# Patient Record
Sex: FEMALE | Race: WHITE | NOT HISPANIC OR LATINO | Employment: OTHER | URBAN - METROPOLITAN AREA
[De-identification: names, ages, dates, MRNs, and addresses within clinical notes are randomized per-mention and may not be internally consistent; named-entity substitution may affect disease eponyms.]

---

## 2018-06-11 ENCOUNTER — HOSPITAL ENCOUNTER (EMERGENCY)
Facility: HOSPITAL | Age: 58
Discharge: HOME | End: 2018-06-11
Attending: EMERGENCY MEDICINE
Payer: MEDICARE

## 2018-06-11 VITALS
RESPIRATION RATE: 18 BRPM | DIASTOLIC BLOOD PRESSURE: 97 MMHG | TEMPERATURE: 99 F | SYSTOLIC BLOOD PRESSURE: 153 MMHG | OXYGEN SATURATION: 99 % | HEART RATE: 90 BPM

## 2018-06-11 DIAGNOSIS — L03.114 CELLULITIS OF LEFT ARM: ICD-10-CM

## 2018-06-11 DIAGNOSIS — M54.12 CERVICAL RADICULOPATHY: Primary | ICD-10-CM

## 2018-06-11 DIAGNOSIS — L03.113 CELLULITIS OF RIGHT ARM: ICD-10-CM

## 2018-06-11 PROCEDURE — 99284 EMERGENCY DEPT VISIT MOD MDM: CPT

## 2018-06-11 RX ORDER — CYCLOBENZAPRINE HCL 10 MG
10 TABLET ORAL 3 TIMES DAILY PRN
Qty: 15 TABLET | Refills: 0 | Status: SHIPPED | OUTPATIENT
Start: 2018-06-11 | End: 2018-06-16

## 2018-06-11 RX ORDER — ACETAMINOPHEN AND CODEINE PHOSPHATE 300; 30 MG/1; MG/1
1-2 TABLET ORAL EVERY 6 HOURS PRN
Qty: 14 TABLET | Refills: 0 | Status: SHIPPED | OUTPATIENT
Start: 2018-06-11 | End: 2019-10-13

## 2018-06-11 RX ORDER — CLINDAMYCIN HYDROCHLORIDE 300 MG/1
300 CAPSULE ORAL 4 TIMES DAILY
Qty: 28 CAPSULE | Refills: 0 | Status: SHIPPED | OUTPATIENT
Start: 2018-06-11 | End: 2018-06-18

## 2018-06-11 NOTE — ED PROVIDER NOTES
Encounter Date: 2018       History     Chief Complaint   Patient presents with    Neck Pain     reports neck pain x1mo. Was seen at [facility] but signed AMA. Reports having MRI studies done. Denies fever.      57 year old female with complaint of neck sided pain with radiation into left arm X several weeks.  Reports worsening over the past day.  Pt had MRI of neck last Monday.  No fever or chills.  Constant aching pain.  Worse with movement.  Also reports pain red lesion to left arm and right arm X one week.           Review of patient's allergies indicates:   Allergen Reactions    Benadryl allergy decongestant      Restless legs    Compazine [prochlorperazine]      Feel like climbing the walls    Imitrex [sumatriptan] Nausea Only    Phenergan plain      Restless legs    Trazodone      Restless leg    Ultram [tramadol] Other (See Comments)     RLS     Past Medical History:   Diagnosis Date    Bipolar 1 disorder     Bipolar 1 disorder     Blind left eye     Migraine headache      Past Surgical History:   Procedure Laterality Date    CARPAL TUNNEL RELEASE       SECTION, CLASSIC      EYE SURGERY      GANGLION CYST EXCISION      HYSTERECTOMY       Family History   Problem Relation Age of Onset    Cancer Mother     Hypertension Mother     Cancer Father     Hypertension Sister      Social History   Substance Use Topics    Smoking status: Current Every Day Smoker     Packs/day: 0.50     Types: Cigarettes    Smokeless tobacco: Never Used    Alcohol use No     Review of Systems   Constitutional: Negative for fever.   HENT: Negative for sore throat.    Respiratory: Negative for shortness of breath.    Cardiovascular: Negative for chest pain.   Gastrointestinal: Negative for nausea.   Genitourinary: Negative for dysuria.   Musculoskeletal: Negative for back pain.        Neck pain    Skin: Negative for rash.   Neurological: Negative for weakness.   Hematological: Does not bruise/bleed easily.        Physical Exam     Initial Vitals [06/11/18 0948]   BP Pulse Resp Temp SpO2   (!) 153/97 90 18 98.9 °F (37.2 °C) 99 %      MAP       115.67         Physical Exam    Nursing note and vitals reviewed.  Constitutional: She appears well-developed and well-nourished.   HENT:   Head: Normocephalic.   Eyes: Conjunctivae are normal. Pupils are equal, round, and reactive to light.   Neck: Normal range of motion.   Cardiovascular: Normal rate, regular rhythm and normal heart sounds.   Pulmonary/Chest: Breath sounds normal.   Abdominal: Soft.   Musculoskeletal:   Left trapezial tenderness, pain with ROM of neck and left arm, full ROM X 4, 5/5 X 4   Neurological: She is alert.   Skin: Skin is warm. Capillary refill takes less than 2 seconds.   1 cm resolving area of erythema on left and right forearm, no pus drainage, no induration          ED Course   Procedures  Labs Reviewed - No data to display       No orders to display                             Clinical Impression:   The primary encounter diagnosis was Cervical radiculopathy. Diagnoses of Cellulitis of right arm and Cellulitis of left arm were also pertinent to this visit.                             Johnathon Francisco NP  06/11/18 1016

## 2020-08-05 PROBLEM — K56.609 SBO (SMALL BOWEL OBSTRUCTION): Status: ACTIVE | Noted: 2020-08-05

## 2020-08-24 PROBLEM — K56.609 SMALL BOWEL OBSTRUCTION: Status: ACTIVE | Noted: 2020-08-24

## 2020-08-25 ENCOUNTER — DOCUMENT SCAN (OUTPATIENT)
Dept: HOME HEALTH SERVICES | Facility: HOSPITAL | Age: 60
End: 2020-08-25

## 2020-08-26 ENCOUNTER — EXTERNAL HOME HEALTH (OUTPATIENT)
Dept: HOME HEALTH SERVICES | Facility: HOSPITAL | Age: 60
End: 2020-08-26

## 2020-08-27 ENCOUNTER — EXTERNAL HOME HEALTH (OUTPATIENT)
Dept: HOME HEALTH SERVICES | Facility: HOSPITAL | Age: 60
End: 2020-08-27

## 2020-09-03 ENCOUNTER — EXTERNAL HOME HEALTH (OUTPATIENT)
Dept: HOME HEALTH SERVICES | Facility: HOSPITAL | Age: 60
End: 2020-09-03

## 2020-09-14 ENCOUNTER — DOCUMENT SCAN (OUTPATIENT)
Dept: HOME HEALTH SERVICES | Facility: HOSPITAL | Age: 60
End: 2020-09-14

## 2020-09-19 PROBLEM — E87.1 HYPONATREMIA: Status: ACTIVE | Noted: 2020-09-19

## 2020-09-19 PROBLEM — K56.609 SBO (SMALL BOWEL OBSTRUCTION): Status: ACTIVE | Noted: 2020-09-19

## 2020-09-19 PROBLEM — E87.6 HYPOKALEMIA: Status: ACTIVE | Noted: 2020-09-19

## 2020-09-19 PROBLEM — R60.0 LOWER EXTREMITY EDEMA: Status: ACTIVE | Noted: 2020-09-19

## 2020-09-19 PROBLEM — F31.9 BIPOLAR DISORDER: Status: ACTIVE | Noted: 2020-09-19

## 2020-09-19 PROBLEM — E86.1 HYPOTENSION DUE TO HYPOVOLEMIA: Status: ACTIVE | Noted: 2020-09-19

## 2020-09-19 PROBLEM — E87.20 LACTIC ACIDOSIS: Status: ACTIVE | Noted: 2020-09-19

## 2020-09-21 ENCOUNTER — DOCUMENT SCAN (OUTPATIENT)
Dept: HOME HEALTH SERVICES | Facility: HOSPITAL | Age: 60
End: 2020-09-21

## 2020-09-23 PROBLEM — K50.00 REGIONAL ENTERITIS OF SMALL BOWEL: Status: ACTIVE | Noted: 2020-09-23

## 2020-09-30 ENCOUNTER — DOCUMENT SCAN (OUTPATIENT)
Dept: HOME HEALTH SERVICES | Facility: HOSPITAL | Age: 60
End: 2020-09-30

## 2020-10-15 ENCOUNTER — DOCUMENT SCAN (OUTPATIENT)
Dept: HOME HEALTH SERVICES | Facility: HOSPITAL | Age: 60
End: 2020-10-15

## 2020-10-28 PROBLEM — F31.9 BIPOLAR 1 DISORDER: Status: ACTIVE | Noted: 2020-10-28

## 2020-10-28 PROBLEM — R53.81 DEBILITATED: Status: ACTIVE | Noted: 2020-10-28

## 2020-10-28 PROBLEM — J18.9 ATYPICAL PNEUMONIA: Status: ACTIVE | Noted: 2020-10-28

## 2020-10-28 PROBLEM — E44.0 MODERATE PROTEIN-CALORIE MALNUTRITION: Status: ACTIVE | Noted: 2020-10-28

## 2020-10-28 PROBLEM — M62.82 RHABDOMYOLYSIS: Status: ACTIVE | Noted: 2020-10-28

## 2020-10-29 PROBLEM — Z75.8 DISCHARGE PLANNING ISSUES: Status: ACTIVE | Noted: 2020-10-29

## 2020-10-29 PROBLEM — I73.9 CLAUDICATION: Status: ACTIVE | Noted: 2020-10-29

## 2020-10-30 PROBLEM — J18.9 PNEUMONIA: Status: ACTIVE | Noted: 2020-10-30

## 2020-10-31 PROBLEM — E43 EDEMA DUE TO MALNUTRITION: Status: ACTIVE | Noted: 2020-10-31

## 2020-10-31 PROBLEM — R60.1 ANASARCA: Status: ACTIVE | Noted: 2020-10-31

## 2020-10-31 PROBLEM — G89.29 CHRONIC PAIN: Status: ACTIVE | Noted: 2020-10-31

## 2020-11-07 PROBLEM — E87.8 ELECTROLYTE ABNORMALITY: Status: ACTIVE | Noted: 2020-11-07

## 2020-11-09 PROBLEM — J96.01 ACUTE HYPOXEMIC RESPIRATORY FAILURE: Status: ACTIVE | Noted: 2020-11-09

## 2020-11-09 PROBLEM — I82.409 ACUTE DEEP VEIN THROMBOSIS (DVT) OF LOWER EXTREMITY: Status: ACTIVE | Noted: 2020-11-09

## 2020-11-13 PROBLEM — K56.609 SBO (SMALL BOWEL OBSTRUCTION): Status: RESOLVED | Noted: 2020-08-05 | Resolved: 2020-11-13

## 2020-11-13 PROBLEM — M62.82 RHABDOMYOLYSIS: Status: RESOLVED | Noted: 2020-10-28 | Resolved: 2020-11-13

## 2020-11-13 PROBLEM — R53.83 FATIGUE: Status: ACTIVE | Noted: 2020-11-13

## 2020-11-16 PROBLEM — R42 ORTHOSTATIC DIZZINESS: Status: ACTIVE | Noted: 2020-11-16

## 2020-11-18 PROBLEM — R53.83 FATIGUE: Status: RESOLVED | Noted: 2020-11-13 | Resolved: 2020-11-18

## 2020-11-20 PROBLEM — J18.9 ATYPICAL PNEUMONIA: Status: RESOLVED | Noted: 2020-10-28 | Resolved: 2020-11-20

## 2020-11-20 PROBLEM — J90 PLEURAL EFFUSION: Status: ACTIVE | Noted: 2020-11-20

## 2020-11-20 PROBLEM — F31.9 BIPOLAR 1 DISORDER: Status: RESOLVED | Noted: 2020-10-28 | Resolved: 2020-11-20

## 2020-11-20 PROBLEM — E87.8 ELECTROLYTE ABNORMALITY: Status: RESOLVED | Noted: 2020-11-07 | Resolved: 2020-11-20

## 2020-11-20 PROBLEM — R42 ORTHOSTATIC DIZZINESS: Status: RESOLVED | Noted: 2020-11-16 | Resolved: 2020-11-20

## 2020-11-20 PROBLEM — E43 EDEMA DUE TO MALNUTRITION: Status: RESOLVED | Noted: 2020-10-31 | Resolved: 2020-11-20

## 2020-11-20 PROBLEM — R53.81 DEBILITATED: Status: RESOLVED | Noted: 2020-10-28 | Resolved: 2020-11-20

## 2020-11-20 PROBLEM — I73.9 CLAUDICATION: Status: RESOLVED | Noted: 2020-10-29 | Resolved: 2020-11-20

## 2020-11-20 PROBLEM — G89.29 CHRONIC PAIN: Status: RESOLVED | Noted: 2020-10-31 | Resolved: 2020-11-20

## 2021-01-08 PROBLEM — K56.609 SBO (SMALL BOWEL OBSTRUCTION): Status: ACTIVE | Noted: 2021-01-08

## 2021-01-10 PROBLEM — K56.7 ILEUS: Status: ACTIVE | Noted: 2021-01-10

## 2021-04-26 PROBLEM — R14.0 ABDOMINAL DISTENTION: Status: ACTIVE | Noted: 2021-04-26

## 2021-04-27 PROBLEM — D50.9 IRON DEFICIENCY ANEMIA: Status: ACTIVE | Noted: 2021-04-27

## 2021-04-27 PROBLEM — R10.84 ABDOMINAL PAIN, DIFFUSE: Status: ACTIVE | Noted: 2021-04-27

## 2021-04-27 PROBLEM — D72.825 BANDEMIA: Status: ACTIVE | Noted: 2021-04-27

## 2021-04-27 PROBLEM — D75.839 THROMBOCYTOSIS: Status: ACTIVE | Noted: 2021-04-27

## 2021-04-27 PROBLEM — E87.1 HYPONATREMIA: Status: RESOLVED | Noted: 2020-09-19 | Resolved: 2021-04-27

## 2021-04-27 PROBLEM — E86.1 HYPOTENSION DUE TO HYPOVOLEMIA: Status: RESOLVED | Noted: 2020-09-19 | Resolved: 2021-04-27

## 2021-04-27 PROBLEM — F11.20 METHADONE DEPENDENCE: Status: ACTIVE | Noted: 2021-04-27

## 2021-10-15 PROBLEM — Z93.3 COLOSTOMY STATUS: Status: ACTIVE | Noted: 2021-10-15

## 2022-01-20 PROBLEM — E86.1 INTRAVASCULAR VOLUME DEPLETION: Status: ACTIVE | Noted: 2022-01-20

## 2022-01-26 PROBLEM — K56.600 PARTIAL INTESTINAL OBSTRUCTION: Status: ACTIVE | Noted: 2022-01-26

## 2022-01-26 PROBLEM — F17.200 SMOKER: Status: ACTIVE | Noted: 2022-01-26

## 2022-01-26 PROBLEM — F31.9 BIPOLAR 1 DISORDER: Status: ACTIVE | Noted: 2022-01-26

## 2022-03-07 PROBLEM — K43.5 PARASTOMAL HERNIA WITHOUT OBSTRUCTION OR GANGRENE: Status: ACTIVE | Noted: 2022-03-07

## 2022-03-08 PROBLEM — D50.9 IRON DEFICIENCY ANEMIA, UNSPECIFIED: Status: ACTIVE | Noted: 2022-03-08

## 2022-03-10 PROBLEM — N17.9 AKI (ACUTE KIDNEY INJURY): Status: ACTIVE | Noted: 2022-03-10

## 2022-03-10 PROBLEM — K65.9 PERITONITIS: Status: ACTIVE | Noted: 2022-03-10

## 2022-03-11 PROBLEM — E44.0 MODERATE PROTEIN-CALORIE MALNUTRITION: Status: RESOLVED | Noted: 2020-10-28 | Resolved: 2022-03-11

## 2022-03-15 PROBLEM — E43 SEVERE PROTEIN-CALORIE MALNUTRITION: Status: ACTIVE | Noted: 2022-03-15

## 2022-03-16 PROBLEM — E55.9 VITAMIN D DEFICIENCY: Status: ACTIVE | Noted: 2022-03-16

## 2022-03-16 PROBLEM — F31.9 BIPOLAR DISORDER: Status: RESOLVED | Noted: 2020-09-19 | Resolved: 2022-03-16

## 2022-03-18 PROBLEM — E16.2 HYPOGLYCEMIA: Status: ACTIVE | Noted: 2022-03-18

## 2022-03-21 PROBLEM — K63.2 ENTEROCUTANEOUS FISTULA: Status: ACTIVE | Noted: 2022-03-21

## 2022-03-22 PROBLEM — B35.6 TINEA CRURIS: Status: ACTIVE | Noted: 2022-03-22

## 2022-03-25 PROBLEM — R53.81 PHYSICAL DECONDITIONING: Status: ACTIVE | Noted: 2022-03-25

## 2022-03-25 PROBLEM — Z71.89 ADVANCE CARE PLANNING: Status: ACTIVE | Noted: 2022-03-25

## 2022-03-25 PROBLEM — T81.89XA NONHEALING SURGICAL WOUND: Status: ACTIVE | Noted: 2022-03-25

## 2022-03-25 PROBLEM — G89.4 CHRONIC PAIN SYNDROME: Status: ACTIVE | Noted: 2020-10-31

## 2022-04-05 PROBLEM — R79.89 ELEVATED LFTS: Status: ACTIVE | Noted: 2022-04-05

## 2022-04-12 PROBLEM — I95.9 HYPOTENSION: Status: ACTIVE | Noted: 2020-09-19

## 2022-04-14 PROBLEM — R10.9 ACUTE LEFT FLANK PAIN: Status: ACTIVE | Noted: 2022-04-14

## 2022-04-15 PROBLEM — J90 PLEURAL EFFUSION ON LEFT: Status: ACTIVE | Noted: 2022-04-15

## 2022-04-16 PROBLEM — R50.9 FEVER: Status: ACTIVE | Noted: 2022-04-16

## 2022-04-20 PROBLEM — R50.9 FEVER: Status: RESOLVED | Noted: 2022-04-16 | Resolved: 2022-04-20

## 2022-04-20 PROBLEM — K56.609 SBO (SMALL BOWEL OBSTRUCTION): Status: RESOLVED | Noted: 2021-01-08 | Resolved: 2022-04-20

## 2022-04-20 PROBLEM — G89.4 CHRONIC PAIN SYNDROME: Status: RESOLVED | Noted: 2020-10-31 | Resolved: 2022-04-20

## 2022-04-20 PROBLEM — Z93.3 COLOSTOMY STATUS: Status: RESOLVED | Noted: 2021-10-15 | Resolved: 2022-04-20

## 2022-04-20 PROBLEM — E55.9 VITAMIN D DEFICIENCY: Status: RESOLVED | Noted: 2022-03-16 | Resolved: 2022-04-20

## 2022-04-20 PROBLEM — R10.84 ABDOMINAL PAIN, DIFFUSE: Status: RESOLVED | Noted: 2021-04-27 | Resolved: 2022-04-20

## 2022-04-20 PROBLEM — J96.01 ACUTE HYPOXEMIC RESPIRATORY FAILURE: Status: RESOLVED | Noted: 2020-11-09 | Resolved: 2022-04-20

## 2022-04-20 PROBLEM — K56.609 SBO (SMALL BOWEL OBSTRUCTION): Status: RESOLVED | Noted: 2020-09-19 | Resolved: 2022-04-20

## 2022-04-20 PROBLEM — R14.0 ABDOMINAL DISTENTION: Status: RESOLVED | Noted: 2021-04-26 | Resolved: 2022-04-20

## 2022-04-20 PROBLEM — E86.1 INTRAVASCULAR VOLUME DEPLETION: Status: RESOLVED | Noted: 2022-01-20 | Resolved: 2022-04-20

## 2022-04-20 PROBLEM — R60.0 LOWER EXTREMITY EDEMA: Status: RESOLVED | Noted: 2020-09-19 | Resolved: 2022-04-20

## 2022-04-20 PROBLEM — E87.20 LACTIC ACIDOSIS: Status: RESOLVED | Noted: 2020-09-19 | Resolved: 2022-04-20

## 2022-04-20 PROBLEM — E16.2 HYPOGLYCEMIA: Status: RESOLVED | Noted: 2022-03-18 | Resolved: 2022-04-20

## 2022-04-20 PROBLEM — K63.2 ENTEROCUTANEOUS FISTULA: Status: RESOLVED | Noted: 2022-03-21 | Resolved: 2022-04-20

## 2022-04-20 PROBLEM — K56.609 SMALL BOWEL OBSTRUCTION: Status: RESOLVED | Noted: 2020-08-24 | Resolved: 2022-04-20

## 2022-04-20 PROBLEM — F17.200 SMOKER: Status: RESOLVED | Noted: 2022-01-26 | Resolved: 2022-04-20

## 2022-04-20 PROBLEM — N17.9 AKI (ACUTE KIDNEY INJURY): Status: RESOLVED | Noted: 2022-03-10 | Resolved: 2022-04-20

## 2022-04-20 PROBLEM — K65.9 PERITONITIS: Status: RESOLVED | Noted: 2022-03-10 | Resolved: 2022-04-20

## 2022-04-20 PROBLEM — E87.6 HYPOKALEMIA: Status: RESOLVED | Noted: 2020-09-19 | Resolved: 2022-04-20

## 2022-04-20 PROBLEM — K43.5 PARASTOMAL HERNIA WITHOUT OBSTRUCTION OR GANGRENE: Status: RESOLVED | Noted: 2022-03-07 | Resolved: 2022-04-20

## 2022-04-20 PROBLEM — K56.7 ILEUS: Status: RESOLVED | Noted: 2021-01-10 | Resolved: 2022-04-20

## 2022-04-20 PROBLEM — Z75.8 DISCHARGE PLANNING ISSUES: Status: RESOLVED | Noted: 2020-10-29 | Resolved: 2022-04-20

## 2022-04-20 PROBLEM — I95.9 HYPOTENSION: Status: RESOLVED | Noted: 2020-09-19 | Resolved: 2022-04-20

## 2022-04-20 PROBLEM — R53.81 PHYSICAL DECONDITIONING: Status: RESOLVED | Noted: 2022-03-25 | Resolved: 2022-04-20

## 2022-04-20 PROBLEM — I82.409 ACUTE DEEP VEIN THROMBOSIS (DVT) OF LOWER EXTREMITY: Status: RESOLVED | Noted: 2020-11-09 | Resolved: 2022-04-20

## 2022-04-20 PROBLEM — F31.9 BIPOLAR 1 DISORDER: Status: RESOLVED | Noted: 2022-01-26 | Resolved: 2022-04-20

## 2022-04-20 PROBLEM — R79.89 ELEVATED LFTS: Status: RESOLVED | Noted: 2022-04-05 | Resolved: 2022-04-20

## 2022-04-20 PROBLEM — D75.839 THROMBOCYTOSIS: Status: RESOLVED | Noted: 2021-04-27 | Resolved: 2022-04-20

## 2022-04-20 PROBLEM — R10.9 ACUTE LEFT FLANK PAIN: Status: RESOLVED | Noted: 2022-04-14 | Resolved: 2022-04-20

## 2022-04-20 PROBLEM — D50.9 IRON DEFICIENCY ANEMIA, UNSPECIFIED: Status: RESOLVED | Noted: 2022-03-08 | Resolved: 2022-04-20

## 2022-04-20 PROBLEM — B35.6 TINEA CRURIS: Status: RESOLVED | Noted: 2022-03-22 | Resolved: 2022-04-20

## 2022-04-20 PROBLEM — J90 PLEURAL EFFUSION: Status: RESOLVED | Noted: 2020-11-20 | Resolved: 2022-04-20

## 2022-04-20 PROBLEM — K50.00 REGIONAL ENTERITIS OF SMALL BOWEL: Status: RESOLVED | Noted: 2020-09-23 | Resolved: 2022-04-20

## 2022-04-20 PROBLEM — K56.600 PARTIAL INTESTINAL OBSTRUCTION: Status: RESOLVED | Noted: 2022-01-26 | Resolved: 2022-04-20

## 2022-04-20 PROBLEM — D72.825 BANDEMIA: Status: RESOLVED | Noted: 2021-04-27 | Resolved: 2022-04-20

## 2022-04-27 PROBLEM — Z91.199 PATIENT'S NONCOMPLIANCE WITH OTHER MEDICAL TREATMENT AND REGIMEN: Status: ACTIVE | Noted: 2022-04-27

## 2022-04-27 PROBLEM — Z75.8 DISCHARGE PLANNING ISSUES: Status: ACTIVE | Noted: 2022-04-27

## 2022-05-05 ENCOUNTER — LAB VISIT (OUTPATIENT)
Dept: LAB | Facility: HOSPITAL | Age: 62
End: 2022-05-05
Payer: MEDICARE

## 2022-05-05 DIAGNOSIS — K63.2 FISTULA OF INTESTINE, EXCLUDING RECTUM AND ANUS: Primary | ICD-10-CM

## 2022-05-05 DIAGNOSIS — K50.913 CROHN'S DISEASE WITH FISTULA: ICD-10-CM

## 2022-05-05 DIAGNOSIS — E43 ALIMENTARY EDEMA: ICD-10-CM

## 2022-05-05 LAB
ALBUMIN SERPL BCP-MCNC: 2.3 G/DL (ref 3.5–5.2)
ALBUMIN SERPL BCP-MCNC: 2.3 G/DL (ref 3.5–5.2)
ALP SERPL-CCNC: 236 U/L (ref 55–135)
ALP SERPL-CCNC: 248 U/L (ref 55–135)
ALT SERPL W/O P-5'-P-CCNC: 49 U/L (ref 10–44)
ALT SERPL W/O P-5'-P-CCNC: 51 U/L (ref 10–44)
ANION GAP SERPL CALC-SCNC: 8 MMOL/L (ref 8–16)
ANION GAP SERPL CALC-SCNC: 9 MMOL/L (ref 8–16)
AST SERPL-CCNC: 27 U/L (ref 10–40)
AST SERPL-CCNC: 29 U/L (ref 10–40)
BASOPHILS # BLD AUTO: 0.03 K/UL (ref 0–0.2)
BASOPHILS NFR BLD: 0.4 % (ref 0–1.9)
BILIRUB SERPL-MCNC: 0.4 MG/DL (ref 0.1–1)
BILIRUB SERPL-MCNC: 0.4 MG/DL (ref 0.1–1)
BUN SERPL-MCNC: 26 MG/DL (ref 6–20)
BUN SERPL-MCNC: 26 MG/DL (ref 6–20)
CALCIUM SERPL-MCNC: 8.7 MG/DL (ref 8.7–10.5)
CALCIUM SERPL-MCNC: 8.8 MG/DL (ref 8.7–10.5)
CHLORIDE SERPL-SCNC: 105 MMOL/L (ref 95–110)
CHLORIDE SERPL-SCNC: 106 MMOL/L (ref 95–110)
CO2 SERPL-SCNC: 22 MMOL/L (ref 23–29)
CO2 SERPL-SCNC: 24 MMOL/L (ref 23–29)
CREAT SERPL-MCNC: 0.7 MG/DL (ref 0.5–1.4)
CREAT SERPL-MCNC: 0.7 MG/DL (ref 0.5–1.4)
DIFFERENTIAL METHOD: ABNORMAL
EOSINOPHIL # BLD AUTO: 0.6 K/UL (ref 0–0.5)
EOSINOPHIL NFR BLD: 7.7 % (ref 0–8)
ERYTHROCYTE [DISTWIDTH] IN BLOOD BY AUTOMATED COUNT: 15.3 % (ref 11.5–14.5)
EST. GFR  (AFRICAN AMERICAN): >60 ML/MIN/1.73 M^2
EST. GFR  (AFRICAN AMERICAN): >60 ML/MIN/1.73 M^2
EST. GFR  (NON AFRICAN AMERICAN): >60 ML/MIN/1.73 M^2
EST. GFR  (NON AFRICAN AMERICAN): >60 ML/MIN/1.73 M^2
GLUCOSE SERPL-MCNC: 57 MG/DL (ref 70–110)
GLUCOSE SERPL-MCNC: 65 MG/DL (ref 70–110)
HCT VFR BLD AUTO: 31.3 % (ref 37–48.5)
HGB BLD-MCNC: 9.9 G/DL (ref 12–16)
IMM GRANULOCYTES # BLD AUTO: 0.03 K/UL (ref 0–0.04)
IMM GRANULOCYTES NFR BLD AUTO: 0.4 % (ref 0–0.5)
LYMPHOCYTES # BLD AUTO: 2.4 K/UL (ref 1–4.8)
LYMPHOCYTES NFR BLD: 29.4 % (ref 18–48)
MAGNESIUM SERPL-MCNC: 2 MG/DL (ref 1.6–2.6)
MCH RBC QN AUTO: 28.8 PG (ref 27–31)
MCHC RBC AUTO-ENTMCNC: 31.6 G/DL (ref 32–36)
MCV RBC AUTO: 91 FL (ref 82–98)
MONOCYTES # BLD AUTO: 0.7 K/UL (ref 0.3–1)
MONOCYTES NFR BLD: 7.9 % (ref 4–15)
NEUTROPHILS # BLD AUTO: 4.5 K/UL (ref 1.8–7.7)
NEUTROPHILS NFR BLD: 54.2 % (ref 38–73)
NRBC BLD-RTO: 0 /100 WBC
PHOSPHATE SERPL-MCNC: 4.1 MG/DL (ref 2.7–4.5)
PLATELET # BLD AUTO: 452 K/UL (ref 150–450)
PMV BLD AUTO: 9.5 FL (ref 9.2–12.9)
POTASSIUM SERPL-SCNC: 4 MMOL/L (ref 3.5–5.1)
POTASSIUM SERPL-SCNC: 4.1 MMOL/L (ref 3.5–5.1)
PROT SERPL-MCNC: 6.6 G/DL (ref 6–8.4)
PROT SERPL-MCNC: 6.6 G/DL (ref 6–8.4)
RBC # BLD AUTO: 3.44 M/UL (ref 4–5.4)
SODIUM SERPL-SCNC: 137 MMOL/L (ref 136–145)
SODIUM SERPL-SCNC: 137 MMOL/L (ref 136–145)
TRIGL SERPL-MCNC: 106 MG/DL (ref 30–150)
WBC # BLD AUTO: 8.27 K/UL (ref 3.9–12.7)

## 2022-05-05 PROCEDURE — 84478 ASSAY OF TRIGLYCERIDES: CPT

## 2022-05-05 PROCEDURE — 80053 COMPREHEN METABOLIC PANEL: CPT | Mod: 91

## 2022-05-05 PROCEDURE — 84100 ASSAY OF PHOSPHORUS: CPT

## 2022-05-05 PROCEDURE — 85025 COMPLETE CBC W/AUTO DIFF WBC: CPT

## 2022-05-05 PROCEDURE — 83735 ASSAY OF MAGNESIUM: CPT

## 2022-05-06 PROBLEM — K50.90 EXACERBATION OF CROHN'S DISEASE: Status: ACTIVE | Noted: 2022-05-06

## 2022-05-12 PROBLEM — K50.10 CROHN'S COLITIS: Status: ACTIVE | Noted: 2022-05-12

## 2022-05-20 PROBLEM — R50.9 FEVER: Status: ACTIVE | Noted: 2022-05-20

## 2022-05-22 PROBLEM — Z91.199 PATIENT'S NONCOMPLIANCE WITH OTHER MEDICAL TREATMENT AND REGIMEN: Status: RESOLVED | Noted: 2022-04-27 | Resolved: 2022-05-22

## 2022-05-24 PROBLEM — T78.40XA ALLERGIC REACTION: Status: ACTIVE | Noted: 2022-05-24

## 2022-05-27 PROBLEM — D72.829 LEUKOCYTOSIS: Status: ACTIVE | Noted: 2022-05-27

## 2022-05-27 PROBLEM — T81.89XA NONHEALING SURGICAL WOUND: Status: RESOLVED | Noted: 2022-03-25 | Resolved: 2022-05-27

## 2022-06-05 PROBLEM — R53.81 PHYSICAL DEBILITY: Status: ACTIVE | Noted: 2022-06-05

## 2022-08-02 PROBLEM — Z71.89 ADVANCE CARE PLANNING: Status: RESOLVED | Noted: 2022-03-25 | Resolved: 2022-08-02

## 2022-08-02 PROBLEM — R50.9 FEVER: Status: RESOLVED | Noted: 2022-05-20 | Resolved: 2022-08-02

## 2022-08-02 PROBLEM — D72.829 LEUKOCYTOSIS: Status: RESOLVED | Noted: 2022-05-27 | Resolved: 2022-08-02

## 2022-08-02 PROBLEM — R00.0 TACHYCARDIA: Status: ACTIVE | Noted: 2022-08-02

## 2022-08-02 PROBLEM — J90 PLEURAL EFFUSION ON LEFT: Status: RESOLVED | Noted: 2022-04-15 | Resolved: 2022-08-02

## 2022-08-02 PROBLEM — T78.40XA ALLERGIC REACTION: Status: RESOLVED | Noted: 2022-05-24 | Resolved: 2022-08-02

## 2022-08-03 PROBLEM — D64.9 ANEMIA: Status: ACTIVE | Noted: 2022-08-03

## 2022-08-03 PROBLEM — E83.39 HYPOPHOSPHATEMIA: Status: ACTIVE | Noted: 2022-08-03

## 2022-08-05 PROBLEM — E83.39 HYPOPHOSPHATEMIA: Status: RESOLVED | Noted: 2022-08-03 | Resolved: 2022-08-05

## 2022-08-05 PROBLEM — R00.0 TACHYCARDIA: Status: RESOLVED | Noted: 2022-08-02 | Resolved: 2022-08-05

## 2022-08-12 PROBLEM — K65.1 INTRA-ABDOMINAL ABSCESS: Status: ACTIVE | Noted: 2022-08-12

## 2022-08-25 PROBLEM — E43 SEVERE PROTEIN-CALORIE MALNUTRITION: Status: RESOLVED | Noted: 2022-03-15 | Resolved: 2022-08-25
